# Patient Record
Sex: FEMALE | Race: BLACK OR AFRICAN AMERICAN | NOT HISPANIC OR LATINO | URBAN - METROPOLITAN AREA
[De-identification: names, ages, dates, MRNs, and addresses within clinical notes are randomized per-mention and may not be internally consistent; named-entity substitution may affect disease eponyms.]

---

## 2021-03-23 ENCOUNTER — CLAIMS CREATED FROM THE CLAIM WINDOW (OUTPATIENT)
Dept: URBAN - METROPOLITAN AREA CLINIC 94 | Facility: CLINIC | Age: 21
End: 2021-03-23
Payer: COMMERCIAL

## 2021-03-23 DIAGNOSIS — R10.10 UPPER ABDOMINAL PAIN: ICD-10-CM

## 2021-03-23 DIAGNOSIS — K80.20 CALCULUS OF GALLBLADDER WITHOUT CHOLECYSTITIS WITHOUT OBSTRUCTION: ICD-10-CM

## 2021-03-23 DIAGNOSIS — M54.9 UPPER BACK PAIN: ICD-10-CM

## 2021-03-23 PROCEDURE — 74177 CT ABD & PELVIS W/CONTRAST: CPT | Performed by: PHYSICIAN ASSISTANT

## 2021-03-23 PROCEDURE — 99203 OFFICE O/P NEW LOW 30 MIN: CPT | Performed by: INTERNAL MEDICINE

## 2021-03-23 PROCEDURE — 99203 OFFICE O/P NEW LOW 30 MIN: CPT | Performed by: PHYSICIAN ASSISTANT

## 2021-03-23 PROCEDURE — 74177 CT ABD & PELVIS W/CONTRAST: CPT | Performed by: INTERNAL MEDICINE

## 2021-03-23 NOTE — HPI-TODAY'S VISIT:
The patient is being evaluated today for intial office visit. She reports her sx began on 3/20 with acute onset upper back pain. This was sharp/stabbing and left her doubled over. She denied N/V and not associated with food. She was evaluated by [facility]. RUQ US revealed gallstones. We do not have records but pt was also told that she had a UTI. She was rx antibiotic which she is still taking. She reports improvement in the back pain but still sore. She denies RUQ pain or any other sx.  Pt denies acid reflux, changes in bowel habits. She does have Sickle Cell Disease and occasionl sickle cell crisis but not often.

## 2021-04-07 ENCOUNTER — LAB OUTSIDE AN ENCOUNTER (OUTPATIENT)
Dept: URBAN - METROPOLITAN AREA CLINIC 94 | Facility: CLINIC | Age: 21
End: 2021-04-07

## 2021-04-13 ENCOUNTER — OFFICE VISIT (OUTPATIENT)
Dept: URBAN - METROPOLITAN AREA CLINIC 94 | Facility: CLINIC | Age: 21
End: 2021-04-13

## 2021-04-28 ENCOUNTER — DASHBOARD ENCOUNTERS (OUTPATIENT)
Age: 21
End: 2021-04-28

## 2021-04-28 ENCOUNTER — OFFICE VISIT (OUTPATIENT)
Dept: URBAN - METROPOLITAN AREA CLINIC 94 | Facility: CLINIC | Age: 21
End: 2021-04-28
Payer: COMMERCIAL

## 2021-04-28 VITALS
HEART RATE: 78 BPM | HEIGHT: 65 IN | WEIGHT: 169 LBS | TEMPERATURE: 97.9 F | BODY MASS INDEX: 28.16 KG/M2 | DIASTOLIC BLOOD PRESSURE: 72 MMHG | SYSTOLIC BLOOD PRESSURE: 120 MMHG

## 2021-04-28 DIAGNOSIS — K80.20 CALCULUS OF GALLBLADDER WITHOUT CHOLECYSTITIS WITHOUT OBSTRUCTION: ICD-10-CM

## 2021-04-28 PROBLEM — 70342003: Status: ACTIVE | Noted: 2021-03-23

## 2021-04-28 PROCEDURE — 99213 OFFICE O/P EST LOW 20 MIN: CPT | Performed by: INTERNAL MEDICINE

## 2021-04-28 PROCEDURE — 99213 OFFICE O/P EST LOW 20 MIN: CPT | Performed by: PHYSICIAN ASSISTANT

## 2021-04-28 NOTE — HPI-TODAY'S VISIT:
The patient returns today for CT f/u. CT results reveal layering gallstones without signs cholecystitis.   The patient returns today and states that she is feeling much better. She denies any complaints. She denies any further abdominal pain, N/V or flank pain.   She reports her sx began on 3/20 with acute onset upper back pain. This was sharp/stabbing and left her doubled over. She denied N/V and not associated with food. She was evaluated by [facility]. RUQ US revealed gallstones. We do not have records but pt was also told that she had a UTI. She was rx antibiotic which she is still taking. She reports improvement in the back pain but still sore. She denies RUQ pain or any other sx.  Pt denies acid reflux, changes in bowel habits. She does have Sickle Cell Disease and occasionl sickle cell crisis but not often.

## 2023-12-09 NOTE — PHYSICAL EXAM CHEST:
no lesions,  no deformities,  no traumatic injuries,  no significant scars are present,  chest wall non-tender,  no masses present, breathing is unlabored without accessory muscle use,normal breath sounds
No.